# Patient Record
Sex: FEMALE | URBAN - METROPOLITAN AREA
[De-identification: names, ages, dates, MRNs, and addresses within clinical notes are randomized per-mention and may not be internally consistent; named-entity substitution may affect disease eponyms.]

---

## 2017-09-29 ENCOUNTER — APPOINTMENT (OUTPATIENT)
Dept: URBAN - METROPOLITAN AREA CLINIC 282 | Age: 37
Setting detail: DERMATOLOGY
End: 2017-09-30

## 2017-09-29 DIAGNOSIS — L93.0 DISCOID LUPUS ERYTHEMATOSUS: ICD-10-CM

## 2017-09-29 DIAGNOSIS — L70.0 ACNE VULGARIS: ICD-10-CM

## 2017-09-29 PROCEDURE — 99202 OFFICE O/P NEW SF 15 MIN: CPT | Mod: 25

## 2017-09-29 PROCEDURE — OTHER COUNSELING: OTHER

## 2017-09-29 PROCEDURE — OTHER IN-HOUSE DISPENSING PHARMACY: OTHER

## 2017-09-29 PROCEDURE — OTHER PRESCRIPTION: OTHER

## 2017-09-29 PROCEDURE — OTHER BIOPSY BY PUNCH METHOD: OTHER

## 2017-09-29 PROCEDURE — 69100 BIOPSY OF EXTERNAL EAR: CPT

## 2017-09-29 RX ORDER — SPIRONOLACTONE 50 MG/1
TABLET, FILM COATED ORAL
Qty: 60 | Refills: 6 | Status: ERX | COMMUNITY
Start: 2017-09-29

## 2017-09-29 RX ORDER — CLINDAMYCIN PHOSPHATE 1 %
GEL (GRAM) TOPICAL
Qty: 1 | Refills: 6 | Status: ERX | COMMUNITY
Start: 2017-09-29

## 2017-09-29 ASSESSMENT — LOCATION SIMPLE DESCRIPTION DERM
LOCATION SIMPLE: LEFT EAR
LOCATION SIMPLE: LEFT CHEEK

## 2017-09-29 ASSESSMENT — LOCATION ZONE DERM
LOCATION ZONE: FACE
LOCATION ZONE: EAR

## 2017-09-29 ASSESSMENT — LOCATION DETAILED DESCRIPTION DERM
LOCATION DETAILED: LEFT CYMBA CONCHA
LOCATION DETAILED: LEFT INFERIOR MEDIAL MALAR CHEEK

## 2017-09-29 NOTE — PROCEDURE: IN-HOUSE DISPENSING PHARMACY
Product 15 Amount/Unit (Numbers Only): 0
Product 15 Unit Type: mg
Product 2 Application Directions: Cleanse trunk once daily
Product 1 Refills: 6
Render Refills If Set To 0: Yes
Product 3 Price/Unit (In Dollars): 40
Product 1 Units Dispensed: 1
Product 2 Price/Unit (In Dollars): 14
Name Of Product 11: Tazoratene
Product 1 Unit Type: grams
Product 1 Amount/Unit (Numbers Only): 30
Product 1 Application Directions: Apply pea size nightly to face \\nLot: \\nExp:
Product 11 Application Directions: Apply pea size amount to face every other night
Name Of Product 1: Tretinoin .05
Name Of Product 3: Tretinoin 0.1% Cream
Product 2 Unit Type: bottle(s)
Product 11 Price/Unit (In Dollars): 60
Detail Level: Zone
Name Of Product 2: Benzoyl peroxide wash 10%

## 2017-09-29 NOTE — PROCEDURE: BIOPSY BY PUNCH METHOD
Epidermal Sutures: 4-0 Prolene
Bill 80074 For Specimen Handling/Conveyance To Laboratory?: no
Home Suture Removal Text: Patient was provided a home suture removal kit and will remove their sutures at home.  If they have any questions or difficulties they will call the office.
Anesthesia Volume In Cc (Will Not Render If 0): 1
Consent: Written consent was obtained and risks were reviewed including but not limited to scarring, infection, bleeding, scabbing, incomplete removal, nerve damage and allergy to anesthesia.
Detail Level: Detailed
X Size Of Lesion In Cm (Optional): 0
Dressing: bandage
Billing Type: Third-Party Bill
Wound Care: Vaseline
Post-Care Instructions: I reviewed with the patient in detail post-care instructions. Patient is to keep the biopsy site dry overnight, and then apply bacitracin twice daily until healed. Patient may apply hydrogen peroxide soaks to remove any crusting.
Anesthesia Type: 1% lidocaine with epinephrine
Suture Removal: 7 days
Hemostasis: None
Biopsy Type: H and E
Punch Size In Mm: 3
Notification Instructions: Patient will be notified of biopsy results. However, patient instructed to call the office if not contacted within 2 weeks.

## 2017-10-11 ENCOUNTER — APPOINTMENT (OUTPATIENT)
Dept: URBAN - METROPOLITAN AREA CLINIC 282 | Age: 37
Setting detail: DERMATOLOGY
End: 2017-10-30

## 2017-10-11 DIAGNOSIS — L93.0 DISCOID LUPUS ERYTHEMATOSUS: ICD-10-CM

## 2017-10-11 PROCEDURE — OTHER COUNSELING: OTHER

## 2017-10-11 PROCEDURE — 99212 OFFICE O/P EST SF 10 MIN: CPT

## 2017-10-11 PROCEDURE — OTHER PRESCRIPTION: OTHER

## 2017-10-11 PROCEDURE — OTHER ORDER TESTS: OTHER

## 2017-10-11 RX ORDER — TRIAMCINOLONE ACETONIDE 1 MG/G
CREAM TOPICAL BID
Qty: 1 | Refills: 1 | Status: ERX | COMMUNITY
Start: 2017-10-11

## 2017-10-11 ASSESSMENT — LOCATION SIMPLE DESCRIPTION DERM: LOCATION SIMPLE: LEFT EAR

## 2017-10-11 ASSESSMENT — LOCATION DETAILED DESCRIPTION DERM
LOCATION DETAILED: LEFT CRUS OF HELIX
LOCATION DETAILED: LEFT CAVUM CONCHA

## 2017-10-11 ASSESSMENT — LOCATION ZONE DERM: LOCATION ZONE: EAR

## 2023-07-03 ENCOUNTER — OFFICE VISIT (OUTPATIENT)
Dept: URBAN - METROPOLITAN AREA CLINIC 23 | Facility: CLINIC | Age: 43
End: 2023-07-03
Payer: COMMERCIAL

## 2023-07-03 DIAGNOSIS — Z79.899 OTHER LONG TERM (CURRENT) DRUG THERAPY: Primary | ICD-10-CM

## 2023-07-03 DIAGNOSIS — M32.9 SYSTEMIC LUPUS ERYTHEMATOSUS, UNSPECIFIED: ICD-10-CM

## 2023-07-03 PROCEDURE — 92134 CPTRZ OPH DX IMG PST SGM RTA: CPT | Performed by: OPTOMETRIST

## 2023-07-03 PROCEDURE — 99204 OFFICE O/P NEW MOD 45 MIN: CPT | Performed by: OPTOMETRIST

## 2023-07-03 PROCEDURE — 92083 EXTENDED VISUAL FIELD XM: CPT | Performed by: OPTOMETRIST

## 2023-07-03 ASSESSMENT — INTRAOCULAR PRESSURE
OD: 13
OS: 12

## 2023-07-03 ASSESSMENT — KERATOMETRY
OD: 41.75
OS: 41.63

## 2023-07-03 NOTE — IMPRESSION/PLAN
Impression: Other long term (current) drug therapy: Z79.899. Plan: Pt edu on all findings. NO ocular pathology noted OU. Appropriate appearance and IOP OU. MAC OCT and VF 10-2 came back stable OU. Continue to monitor. Discussed to call with any sudden vision changes. RTC 1 year DFE with MAC OCT.